# Patient Record
Sex: MALE | Race: OTHER | Employment: OTHER | URBAN - METROPOLITAN AREA
[De-identification: names, ages, dates, MRNs, and addresses within clinical notes are randomized per-mention and may not be internally consistent; named-entity substitution may affect disease eponyms.]

---

## 2022-11-17 NOTE — PATIENT DISCUSSION
Visually significant PCO present on exam today. Cannot improve vision with refraction/glasses at this time. Discussed treatment options including observation versus Yag capsulotomy. Laser recommended to improve vision. We discussed the risks/benefits of laser capsulotomy. All questions were answered. Patient elects to proceed Yag capsulotomy OS.

## 2022-11-17 NOTE — PROCEDURE NOTE: CLINICAL
PROCEDURE NOTE: YAG Capsulotomy OS. Diagnosis: Visually Significant PCO. Anesthesia: Topical. Prior to commencing surgery patient identification, surgical procedure, site, and side were confirmed by Dr. Cosmo Wilde. Following topical proparacaine anesthesia, the patient was positioned at the YAG laser, a contact lens coupled to the cornea with methylcellulose and an axial posterior performed capsulotomy without complication . Excess methylcellulose was washed from the eye. One drop of Alphagan was instilled and the patient returned to the holding area having tolerated the procedure well and without complication. Rebecca Sheth

## 2023-06-23 ENCOUNTER — NEW PATIENT (OUTPATIENT)
Dept: URBAN - METROPOLITAN AREA CLINIC 20 | Facility: CLINIC | Age: 73
End: 2023-06-23

## 2023-06-23 DIAGNOSIS — H43.393: ICD-10-CM

## 2023-06-23 DIAGNOSIS — H25.813: ICD-10-CM

## 2023-06-23 DIAGNOSIS — H52.4: ICD-10-CM

## 2023-06-23 PROCEDURE — 92250 FUNDUS PHOTOGRAPHY W/I&R: CPT

## 2023-06-23 PROCEDURE — 92015 DETERMINE REFRACTIVE STATE: CPT

## 2023-06-23 PROCEDURE — 92004 COMPRE OPH EXAM NEW PT 1/>: CPT

## 2023-06-23 ASSESSMENT — TONOMETRY
OD_IOP_MMHG: 13
OS_IOP_MMHG: 15

## 2023-06-23 ASSESSMENT — KERATOMETRY
OD_AXISANGLE_DEGREES: 0
OS_K1POWER_DIOPTERS: 44.25
OD_K2POWER_DIOPTERS: 44.50
OS_AXISANGLE_DEGREES: 0
OD_K1POWER_DIOPTERS: 44.50
OD_AXISANGLE2_DEGREES: 90
OS_K2POWER_DIOPTERS: 44.25
OS_AXISANGLE2_DEGREES: 90

## 2023-06-23 ASSESSMENT — VISUAL ACUITY
OU_CC: J1-1
OD_CC: 20/20

## 2024-10-16 ENCOUNTER — COMPREHENSIVE EXAM (OUTPATIENT)
Dept: URBAN - METROPOLITAN AREA CLINIC 20 | Facility: CLINIC | Age: 74
End: 2024-10-16

## 2024-10-16 DIAGNOSIS — H52.4: ICD-10-CM

## 2024-10-16 PROCEDURE — 92015 DETERMINE REFRACTIVE STATE: CPT

## 2024-10-16 PROCEDURE — 92014 COMPRE OPH EXAM EST PT 1/>: CPT
